# Patient Record
Sex: MALE | Race: WHITE | Employment: FULL TIME | URBAN - METROPOLITAN AREA
[De-identification: names, ages, dates, MRNs, and addresses within clinical notes are randomized per-mention and may not be internally consistent; named-entity substitution may affect disease eponyms.]

---

## 2022-08-29 ENCOUNTER — HOSPITAL ENCOUNTER (EMERGENCY)
Age: 32
Discharge: HOME | End: 2022-08-29
Payer: COMMERCIAL

## 2022-08-29 VITALS
WEIGHT: 140 LBS | RESPIRATION RATE: 18 BRPM | SYSTOLIC BLOOD PRESSURE: 150 MMHG | OXYGEN SATURATION: 98 % | BODY MASS INDEX: 21.97 KG/M2 | DIASTOLIC BLOOD PRESSURE: 92 MMHG | TEMPERATURE: 97.7 F | HEART RATE: 60 BPM | HEIGHT: 67 IN

## 2022-08-29 DIAGNOSIS — S46.911A SHOULDER STRAIN, RIGHT, INITIAL ENCOUNTER: Primary | ICD-10-CM

## 2022-08-29 DIAGNOSIS — S46.811A TRAPEZIUS STRAIN, RIGHT, INITIAL ENCOUNTER: ICD-10-CM

## 2022-08-29 PROCEDURE — 99283 EMERGENCY DEPT VISIT LOW MDM: CPT

## 2022-08-29 RX ORDER — CYCLOBENZAPRINE HCL 10 MG
10 TABLET ORAL 3 TIMES DAILY PRN
Qty: 15 TABLET | Refills: 0 | Status: SHIPPED | OUTPATIENT
Start: 2022-08-29 | End: 2022-09-08

## 2022-08-29 RX ORDER — NAPROXEN 500 MG/1
500 TABLET ORAL 2 TIMES DAILY
Qty: 20 TABLET | Refills: 0 | Status: SHIPPED | OUTPATIENT
Start: 2022-08-29

## 2022-08-29 ASSESSMENT — ENCOUNTER SYMPTOMS
ABDOMINAL PAIN: 0
SHORTNESS OF BREATH: 0

## 2022-08-29 NOTE — ED PROVIDER NOTES
[facility] ED  eMERGENCY dEPARTMENT eNCOUnter      Pt Name: Shivani Liu  MRN: [id number]  [place] 1990  Date of evaluation: 8/29/2022  Provider: Kartik Medley PA-C      HISTORY OF PRESENT ILLNESS    HPI  Shivani Liu is a 33 y.o. male, with PMH of remote hernia repair, who presents to the ED with CC of shoulder/upper back pain x 2 weeks. Patient states that he was out jetski-ing and noted injury afterwards. Has been using Tylenol with relief and it was been improving since. He was out jetski-ing again yesterday and felt like the waves made it worse. Denies low back pain, headache, chest pain, palpitations, or shortness of breath. Afebrile. No sick contacts. No numbness or tingling. No history of prior injury. Per nursing triage, patient had fall 2 weeks ago. Patient denies h/o direct trauma. Rather \"tweaked\" neck/shoulder while riding jet ski. REVIEW OF SYSTEMS     Review of Systems   Constitutional:  Negative for fever. Respiratory:  Negative for shortness of breath. Cardiovascular:  Negative for chest pain, palpitations and leg swelling. Gastrointestinal:  Negative for abdominal pain. Musculoskeletal:  Positive for myalgias and neck pain. Skin:  Negative for rash and wound. Neurological:  Negative for dizziness and headaches. Hematological:  Negative for adenopathy. Psychiatric/Behavioral:  Negative for confusion. PAST MEDICAL HISTORY   History reviewed. No pertinent past medical history. SURGICAL HISTORY       Past Surgical History:   Procedure Laterality Date    HERNIA REPAIR         CURRENT MEDICATIONS       Discharge Medication List as of 8/29/2022 12:56 PM        CONTINUE these medications which have NOT CHANGED    Details   lamoTRIgine (LAMICTAL) 25 MG tablet Take 1 tablet by mouth 2 times daily. , Disp-60 tablet, R-2             ALLERGIES     Patient has no known allergies. FAMILY HISTORY     History reviewed. No pertinent family history.      SOCIAL HISTORY       Social History     Socioeconomic History    Marital status: Single     Spouse name: None    Number of children: None    Years of education: None    Highest education level: None   Tobacco Use    Smoking status: Never    Smokeless tobacco: Current     Types: Chew   Substance and Sexual Activity    Alcohol use: Yes     Alcohol/week: 6.0 standard drinks     Types: 6 Cans of beer per week     Comment: socially    Drug use: No    Sexual activity: Yes     Partners: Female       PHYSICAL EXAM       ED Triage Vitals [08/29/22 1223]   BP Temp Temp Source Heart Rate Resp SpO2 Height Weight   (!) 150/92 97.7 °F (36.5 °C) Oral 60 18 98 % 5' 7\" (1.702 m) 140 lb (63.5 kg)       Physical Exam  Vitals and nursing note reviewed. Constitutional:       Appearance: He is not toxic-appearing. HENT:      Head: Normocephalic. Mouth/Throat:      Mouth: Mucous membranes are moist.   Eyes:      Pupils: Pupils are equal, round, and reactive to light. Cardiovascular:      Rate and Rhythm: Normal rate. Pulmonary:      Effort: Pulmonary effort is normal.   Abdominal:      Palpations: Abdomen is soft. Musculoskeletal:      Right shoulder: Tenderness present. No deformity or bony tenderness. Arms:       Cervical back: Neck supple. No rigidity. Lymphadenopathy:      Cervical: No cervical adenopathy. Skin:     General: Skin is warm and dry. Capillary Refill: Capillary refill takes less than 2 seconds. Coloration: Skin is not jaundiced. Findings: No erythema. Neurological:      Mental Status: He is oriented to person, place, and time. Psychiatric:         Mood and Affect: Mood normal.         Behavior: Behavior normal.       Procedures    MDM:   Vitals:    Vitals:    08/29/22 1223   BP: (!) 150/92   Pulse: 60   Resp: 18   Temp: 97.7 °F (36.5 °C)   TempSrc: Oral   SpO2: 98%   Weight: 140 lb (63.5 kg)   Height: 5' 7\" (1.702 m)       Patient presents to ED for complaint, as described above. Patient states this occurred while jetski-ing and has remain steady/improving since, until jet-ski-ing yesterday. Taking Tylenol with some relief. No red flag symptoms noted. Using shared decision making with patient, XR not obtained at today's visit. Recommended Flexeril, Naprosyn Rx and continue OTC Tylenol PRN. Ice/Heat prn and gentle stretching. Standard anticipatory guidance given to patient upon discharge. I have given them a specific time frame in which to follow-up and who to follow-up with. I have also advised them that they should return to the emergency department if they get worse, or not getting better or develop any new or concerning symptoms. Patient demonstrates understanding. FINAL IMPRESSION      1. Shoulder strain, right, initial encounter    2.  Trapezius strain, right, initial encounter          DISPOSITION/PLAN   DISPOSITION Decision To Discharge 08/29/2022 12:41:19 PM      PATIENT REFERRED TO:  Kaleta Oppenheim, APRN - [place]  [facility] [address] [address] [facility]  [phone]    In 2 days      DISCHARGE MEDICATIONS:  Discharge Medication List as of 8/29/2022 12:56 PM        START taking these medications    Details   cyclobenzaprine (FLEXERIL) 10 MG tablet Take 1 tablet by mouth 3 times daily as needed for Muscle spasms, Disp-15 tablet, R-0Normal      naproxen (NAPROSYN) 500 MG tablet Take 1 tablet by mouth 2 times daily, Disp-20 tablet, R-0Normal                  Cady Mendez PA-C (electronically signed)  Emergency Physician Assistant       Cady Mendez PA-C  08/29/22 [id number]

## 2022-08-29 NOTE — ED TRIAGE NOTES
Patient to ER with fall x2 weeks ago with injured right side of neck. and has worsened in the last 24 hours.  Electronically signed by Dora Simpson RN on 8/29/2022 at 12:27 PM

## 2022-08-29 NOTE — Clinical Note
Angus Mccurdy was seen and treated in our emergency department on 8/29/2022. He may return to work on 08/30/2022. Please allow the following restrictions in place x 7 days:  - No heavy lifting over 15 pounds  - Allow predominately Left handed work, as able  - Allow to change position, stretch, and rest as needed    Thank you! If you have any questions or concerns, please don't hesitate to call.       [facility]

## 2023-07-17 LAB
CALCULI COMPOSITION: NORMAL
CALCULI DESCRIPTION: NORMAL
CALCULI MASS: 5 MG

## 2024-01-08 ENCOUNTER — APPOINTMENT (OUTPATIENT)
Dept: RADIOLOGY | Facility: HOSPITAL | Age: 34
End: 2024-01-08
Payer: COMMERCIAL

## 2024-01-15 ENCOUNTER — APPOINTMENT (OUTPATIENT)
Dept: UROLOGY | Facility: CLINIC | Age: 34
End: 2024-01-15
Payer: COMMERCIAL